# Patient Record
Sex: FEMALE | Race: WHITE | URBAN - METROPOLITAN AREA
[De-identification: names, ages, dates, MRNs, and addresses within clinical notes are randomized per-mention and may not be internally consistent; named-entity substitution may affect disease eponyms.]

---

## 2017-04-27 ENCOUNTER — THERAPY VISIT (OUTPATIENT)
Dept: PHYSICAL THERAPY | Facility: CLINIC | Age: 52
End: 2017-04-27
Payer: COMMERCIAL

## 2017-04-27 DIAGNOSIS — M25.551 HIP PAIN, RIGHT: Primary | ICD-10-CM

## 2017-04-27 PROCEDURE — 97110 THERAPEUTIC EXERCISES: CPT | Mod: GP | Performed by: PHYSICAL THERAPIST

## 2017-04-27 PROCEDURE — 97161 PT EVAL LOW COMPLEX 20 MIN: CPT | Mod: GP | Performed by: PHYSICAL THERAPIST

## 2017-04-27 ASSESSMENT — ACTIVITIES OF DAILY LIVING (ADL)
ROLLING_OVER_IN_BED: MODERATE DIFFICULTY
PUTTING_ON_SOCKS_AND_SHOES: SLIGHT DIFFICULTY
GOING_UP_1_FLIGHT_OF_STAIRS: EXTREME DIFFICULTY
GETTING_INTO_AND_OUT_OF_AN_AVERAGE_CAR: MODERATE DIFFICULTY
WALKING_DOWN_STEEP_HILLS: EXTREME DIFFICULTY
WALKING_UP_STEEP_HILLS: EXTREME DIFFICULTY
HOS_ADL_COUNT: 13
SITTING_FOR_15_MINUTES: MODERATE DIFFICULTY
STANDING_FOR_15_MINUTES: EXTREME DIFFICULTY
LIGHT_TO_MODERATE_WORK: EXTREME DIFFICULTY
HOS_ADL_ITEM_SCORE_TOTAL: 14
RECREATIONAL_ACTIVITIES: UNABLE TO DO
HEAVY_WORK: EXTREME DIFFICULTY
WALKING_APPROXIMATELY_10_MINUTES: EXTREME DIFFICULTY
HOS_ADL_HIGHEST_POTENTIAL_SCORE: 52
WALKING_INITIALLY: EXTREME DIFFICULTY
GOING_DOWN_1_FLIGHT_OF_STAIRS: EXTREME DIFFICULTY
WALKING_15_MINUTES_OR_GREATER: EXTREME DIFFICULTY

## 2017-04-27 NOTE — MR AVS SNAPSHOT
After Visit Summary   4/27/2017    Jessika Chen    MRN: [id number]           Patient Information     Date Of Birth          1965        Visit Information        Provider Department      4/27/2017 1:30 PM Dorothy Mondragon, PT [facility]        Today's Diagnoses     Hip pain, right    -  1       Follow-ups after your visit        Your next 10 appointments already scheduled     May 04, 2017  4:50 PM CDT   PATRICIA Extremity with Dorothy Mondragon, PT   [facility] ([facility]  )    [address]  [facility] [address]   [phone]            May 11, 2017  4:50 PM CDT   PATRICIA Extremity with Dorothy Mondragon, PT   [facility] ([facility]  )    [address]  [facility] [address] [phone]            May 15, 2017  4:50 PM CDT   PATRICIA Extremity with Dorothy Mondragon, PT   [facility] ([facility]  )    [address]  [facility] [address]   [phone]            May 25, 2017  4:50 PM CDT   PATRICIA Extremity with Dorothy Mondragon, PT   [facility] ([facility]  )    [address]  [facility] [address] [phone]              Who to contact     If you have questions or need follow up information about today's clinic visit or your schedule please contact [facility] PHYS[facility] THERAP[facility] directly at [phone].  Normal or non-critical lab and imaging results will be communicated to you by MyChart, letter or phone within 4 business days after the clinic has received the results. If you do not hear from us within 7 days, please contact the clinic through MyChart or phone. If you have a critical or abnormal lab result, we will notify you by phone as soon as possible.  Submit refill requests through [employer] or call your pharmacy and they will forward the refill  "request to us. Please allow 3 business days for your refill to be completed.          Additional Information About Your Visit        MyChart Information     [employer] lets you send messages to your doctor, view your test results, renew your prescriptions, schedule appointments and more. To sign up, go to [email or web address] . Click on \"Log in\" on the left side of the screen, which will take you to the Welcome page. Then click on \"Sign up Now\" on the right side of the page.     You will be asked to enter the access code listed below, as well as some personal information. Please follow the directions to create your username and password.     Your access code is: [email or web address]  Expires: 2017  1:21 PM     Your access code will  in 90 days. If you need help or a new code, please call your [place] clinic or [phone].        Care EveryWhere ID     This is your Care EveryWhere ID. This could be used by other organizations to access your [facility] medical records  [id number]         Blood Pressure from Last 3 Encounters:   No data found for BP    Weight from Last 3 Encounters:   No data found for Wt              We Performed the Following     HC PT EVAL, LOW COMPLEXITY     PATRICIA INITIAL EVAL REPORT     THERAPEUTIC EXERCISES        Primary Care Provider    [facility]       No address on file        Thank you!     Thank you for choosing [facility] THERAP[facility]  for your care. Our goal is always to provide you with excellent care. Hearing back from our patients is one way we can continue to improve our services. Please take a few minutes to complete the written survey that you may receive in the mail after your visit with us. Thank you!             Your Updated Medication List - Protect others around you: Learn how to safely use, store and throw away your medicines at www.disposemymeds.org.      Notice  As of 2017  4:04 PM    You have not been prescribed any " medications.

## 2017-04-27 NOTE — PROGRESS NOTES
[facility] Initial Evaluation    Subjective:    Patient is a 51 year old female presenting with rehab right hip hpi. The history is provided by the patient.   Jessika Chen is a 51 year old female with a right hip condition.  Condition occurred with:  Repetition/overuse.  Condition occurred: at work.  This is a chronic condition  Patient reports onset of R hip pain about 9 months ago.  Relates pain to standing 10 hour shifts 2 days in a row on concrete floors at a part time job.  Patient cut back on hours and then quit job but pain has persisted.  Received physical therapy 4/20/2017..    Patient reports pain:  Groin, posterior and anterior.    Pain is described as aching and is constant and reported as 8/10.  Associated symptoms:  Locking (locking while standing, dec with walking). Pain is the same all the time.  Symptoms are exacerbated by running, walking, descending stairs, ascending stairs, standing and other (crossing legs, disturbed sleep-wakes about 1x/night, not going to gym) and relieved by ice and rest.  Since onset symptoms are gradually worsening.  Special tests:  X-ray (hip dysplasia, mild OA).      General health as reported by patient is excellent.  Pertinent medical history includes:  Overweight, asthma, migraines and sleep disorder/apnea.  Medical allergies: no.  Other surgeries include:  None reported.  Current medications:  Anti-inflammatory.  Current occupation is Self-employed.  Patient is working in normal job without restrictions.  Primary job tasks include:  Prolonged sitting and lifting.    Barriers include:  None as reported by the patient.    Red flags:  None as reported by the patient.                        Objective:      Gait:    Gait Type:  Antalgic                                                      Hip Evaluation  HIP AROM:  : LROM: Flex-Nil loss, inc sx, NW.  Ext-nil loss, NE, NE.  SGIS-mod loss, NE, NE.  Flexion: Left: 105    Right:  91      Abduction:  Left:  45    Right:  35              Hip PROM:    Flexion: Left: 120   Right: 104  Extension: Left:  Right: Limited vs L      Internal Rotation: Left: 45    Right: 10  External Rotation: Left: 65    Right: 50      Pain: ERP with flex, IR on R.  Ext limited on R but not painful.        Hip Strength:        Abduction:  Right: 5/5   -   Pain:                  Hip Special Testing:   Special tests hip not assessed: Repeated movements: repeated ext in kneeling-inc, NW, dec flex, inc IR and ER ROM.  Prone hip IR/ER-dec, B, inc flex.  Press ups-dec, B.    Left hip negative for the following special tests:  Vivek  Right hip negative for the following special tests:  Vivek    Hip Palpation:  Normal                    General     ROS    Assessment/Plan:      Patient is a 51 year old female with right side hip complaints.    Patient has the following significant findings with corresponding treatment plan.                Diagnosis 1:  R hip pain  Pain -  manual therapy, directional preference exercise and home program  Decreased ROM/flexibility - manual therapy, therapeutic exercise and home program  Decreased joint mobility - manual therapy, therapeutic exercise and home program  Impaired gait - gait training and home program  Decreased function - therapeutic activities and home program    Therapy Evaluation Codes:   1) History comprised of:   Personal factors that impact the plan of care:      None.    Comorbidity factors that impact the plan of care are:      None.     Medications impacting care: None.  2) Examination of Body Systems comprised of:   Body structures and functions that impact the plan of care:      Hip.   Activity limitations that impact the plan of care are:      Running and Walking.  3) Clinical presentation characteristics are:   Stable/Uncomplicated.  4) Decision-Making    Low complexity using standardized patient assessment instrument and/or measureable assessment of functional outcome.  Cumulative Therapy  Evaluation is: Low complexity.    Previous and current functional limitations:  (See Goal Flow Sheet for this information)    Short term and Long term goals: (See Goal Flow Sheet for this information)     Communication ability:  Patient appears to be able to clearly communicate and understand verbal and written communication and follow directions correctly.  Treatment Explanation - The following has been discussed with the patient:   RX ordered/plan of care  Anticipated outcomes  Possible risks and side effects  This patient would benefit from PT intervention to resume normal activities.   Rehab potential is good.    Frequency:  1 X week, once daily  Duration:  for 6 weeks  Discharge Plan:  Achieve all LTG.  Independent in home treatment program.  Reach maximal therapeutic benefit.    Please refer to the daily flowsheet for treatment today, total treatment time and time spent performing 1:1 timed codes.

## 2017-04-27 NOTE — LETTER
[facility]ER[facility]  [address]  [facility] [address]  [phone]    2017    Re: Jessika Chen   :   1965  MRN:  [id number]   REFERRING PHYSICIAN:   Celeste Mcneil    [facility]  Date of Initial Evaluation:  17  Visits:  Rxs Used: 1  Reason for Referral:  Hip pain, right    EVALUATION SUMMARY    [facility] Initial Evaluation    Subjective:  Patient is a 51 year old female presenting with rehab right hip hpi. The history is provided by the patient.     Jessika Chen is a 51 year old female with a right hip condition. Condition occurred with:  Repetition/overuse. Condition occurred: at work. This is a chronic condition.  Patient reports onset of R hip pain about 9 months ago.  Relates pain to standing 10 hour shifts 2 days in a row on concrete floors at a part time job.  Patient cut back on hours and then quit job but pain has persisted.  Received physical therapy 2017.      Patient reports pain:  Groin, posterior and anterior. Pain is described as aching and is constant and reported as 8/10.  Associated symptoms: Locking (locking while standing, dec with walking). Pain is the same all the time.  Symptoms are exacerbated by running, walking, descending stairs, ascending stairs, standing and other (crossing legs, disturbed sleep-wakes about 1x/night, not going to gym) and relieved by ice and rest.  Since onset symptoms are gradually worsening. Special tests: X-ray (hip dysplasia, mild OA).          General health as reported by patient is excellent.      Pertinent medical history includes:  Overweight, asthma, migraines and sleep disorder/apnea.  Medical allergies: no.  Other surgeries include:  None reported.  Current medications:  Anti-inflammatory.  Current occupation is Self-employed.  Patient is working in normal job without restrictions.  Primary job tasks include:  Prolonged sitting  and lifting.    Barriers include:  None as reported by the patient.    Red flags:  None as reported by the patient.        Objective:  Gait:    Gait Type:  Antalgic       Hip Evaluation  HIP AROM:  : LROM: Flex-Nil loss, inc sx, NW.  Ext-nil loss, NE, NE.  SGIS-mod loss, NE, NE.  Flexion: Left: 105    Right:  91    Abduction: Left:  45    Right:  35     Hip PROM:    Flexion: Left: 120   Right: 104  Extension: Left:  Right: Limited vs L    Internal Rotation: Left: 45    Right: 10  External Rotation: Left: 65    Right: 50    Pain: ERP with flex, IR on R.  Ext limited on R but not painful.      Hip Strength:  Abduction:  Right: 5/5   -   Pain:    Hip Special Testing:   Special tests hip not assessed: Repeated movements: repeated ext in kneeling-inc, NW, dec flex, inc IR and ER ROM.  Prone hip IR/ER-dec, B, inc flex.  Press ups-dec, B.    Left hip negative for the following special tests:  Vivek  Right hip negative for the following special tests:  Vivek      Hip Palpation:  Normal     Assessment/Plan:    Patient is a 51 year old female with right side hip complaints.    Patient has the following significant findings with corresponding treatment plan.                  Diagnosis 1:  R hip pain  Pain -  manual therapy, directional preference exercise and home program  Decreased ROM/flexibility - manual therapy, therapeutic exercise and home program  Decreased joint mobility - manual therapy, therapeutic exercise and home program  Impaired gait - gait training and home program  Decreased function - therapeutic activities and home program    Therapy Evaluation Codes:   1) History comprised of:   Personal factors that impact the plan of care:      None.    Comorbidity factors that impact the plan of care are:      None.     Medications impacting care: None.    2) Examination of Body Systems comprised of:   Body structures and functions that impact the plan of care:      Hip.   Activity limitations that impact the plan of  care are:      Running and Walking.  3) Clinical presentation characteristics are:   Stable/Uncomplicated.  4) Decision-Making    Low complexity using standardized patient assessment instrument and/or measureable assessment of functional outcome.  Cumulative Therapy Evaluation is: Low complexity.    Previous and current functional limitations:  (See Goal Flow Sheet for this information)    Short term and Long term goals: (See Goal Flow Sheet for this information)     Communication ability:  Patient appears to be able to clearly communicate and understand verbal and written communication and follow directions correctly.    Treatment Explanation - The following has been discussed with the patient:   RX ordered/plan of care  Anticipated outcomes  Possible risks and side effects    This patient would benefit from PT intervention to resume normal activities.   Rehab potential is good.  Frequency:  1 X week, once daily  Duration:  for 6 weeks  Discharge Plan:  Achieve all LTG.  Independent in home treatment program.  Reach maximal therapeutic benefit.    Thank you for your referral.    INQUIRIES  Therapist: Dorothy Mondragon, PT, Cert. MDT  [facility] PHYSICAL THERAP[facility]  [address]  [facility] [address]  Phone: [phone]  Fax: [phone]

## 2017-05-04 ENCOUNTER — THERAPY VISIT (OUTPATIENT)
Dept: PHYSICAL THERAPY | Facility: CLINIC | Age: 52
End: 2017-05-04
Payer: COMMERCIAL

## 2017-05-04 DIAGNOSIS — M25.551 HIP PAIN, RIGHT: ICD-10-CM

## 2017-05-04 PROCEDURE — 97110 THERAPEUTIC EXERCISES: CPT | Mod: GP | Performed by: PHYSICAL THERAPIST

## 2017-05-04 PROCEDURE — 97140 MANUAL THERAPY 1/> REGIONS: CPT | Mod: GP | Performed by: PHYSICAL THERAPIST

## 2017-05-11 ENCOUNTER — THERAPY VISIT (OUTPATIENT)
Dept: PHYSICAL THERAPY | Facility: CLINIC | Age: 52
End: 2017-05-11
Payer: COMMERCIAL

## 2017-05-11 DIAGNOSIS — M25.551 HIP PAIN, RIGHT: ICD-10-CM

## 2017-05-11 PROCEDURE — 97530 THERAPEUTIC ACTIVITIES: CPT | Mod: GP | Performed by: PHYSICAL THERAPIST

## 2017-05-11 PROCEDURE — 97110 THERAPEUTIC EXERCISES: CPT | Mod: GP | Performed by: PHYSICAL THERAPIST

## 2017-05-11 PROCEDURE — 97140 MANUAL THERAPY 1/> REGIONS: CPT | Mod: GP | Performed by: PHYSICAL THERAPIST

## 2017-05-15 ENCOUNTER — THERAPY VISIT (OUTPATIENT)
Dept: PHYSICAL THERAPY | Facility: CLINIC | Age: 52
End: 2017-05-15
Payer: COMMERCIAL

## 2017-05-15 DIAGNOSIS — M25.551 HIP PAIN, RIGHT: ICD-10-CM

## 2017-05-15 PROCEDURE — 97140 MANUAL THERAPY 1/> REGIONS: CPT | Mod: GP | Performed by: PHYSICAL THERAPIST

## 2017-05-15 PROCEDURE — 97110 THERAPEUTIC EXERCISES: CPT | Mod: GP | Performed by: PHYSICAL THERAPIST

## 2017-05-15 NOTE — MR AVS SNAPSHOT
"              After Visit Summary   5/15/2017    Jessika Chen    MRN: [id number]           Patient Information     Date Of Birth          1965        Visit Information        Provider Department      5/15/2017 4:50 PM Dorothy Mondragon, PT [facility]        Today's Diagnoses     Hip pain, right           Follow-ups after your visit        Your next 10 appointments already scheduled     May 25, 2017  4:50 PM CDT   PATRICIA Extremity with Dorothy Mondragon, PT   [facility] ([facility]  )    [address]  [facility] [address] [phone]              Who to contact     If you have questions or need follow up information about today's clinic visit or your schedule please contact [facility] PHYS[facility] THERAP[facility] directly at [phone].  Normal or non-critical lab and imaging results will be communicated to you by MyChart, letter or phone within 4 business days after the clinic has received the results. If you do not hear from us within 7 days, please contact the clinic through [employer] or phone. If you have a critical or abnormal lab result, we will notify you by phone as soon as possible.  Submit refill requests through [employer] or call your pharmacy and they will forward the refill request to us. Please allow 3 business days for your refill to be completed.          Additional Information About Your Visit        MyChart Information     [employer] lets you send messages to your doctor, view your test results, renew your prescriptions, schedule appointments and more. To sign up, go to [email or web address] . Click on \"Log in\" on the left side of the screen, which will take you to the Welcome page. Then click on \"Sign up Now\" on the right side of the page.     You will be asked to enter the access code listed below, as well as some personal information. Please follow the directions to create your username and " password.     Your access code is: [email or web address]  Expires: 2017  1:21 PM     Your access code will  in 90 days. If you need help or a new code, please call your [facility] clinic or [phone].        Care EveryWhere ID     This is your Care EveryWhere ID. This could be used by other organizations to access your [facility] medical records  [id number]         Blood Pressure from Last 3 Encounters:   No data found for BP    Weight from Last 3 Encounters:   No data found for Wt              We Performed the Following     MANUAL THER TECH,1+REGIONS,EA 15 MIN     THERAPEUTIC EXERCISES        Primary Care Provider    [facility]       No address on file        Thank you!     Thank you for choosing [facility] PHYS[facility] THERAP[facility]  for your care. Our goal is always to provide you with excellent care. Hearing back from our patients is one way we can continue to improve our services. Please take a few minutes to complete the written survey that you may receive in the mail after your visit with us. Thank you!             Your Updated Medication List - Protect others around you: Learn how to safely use, store and throw away your medicines at www.disposemymeds.org.      Notice  As of 5/15/2017  5:32 PM    You have not been prescribed any medications.

## 2017-09-25 PROBLEM — M25.551 HIP PAIN, RIGHT: Status: RESOLVED | Noted: 2017-04-27 | Resolved: 2017-09-25

## 2017-09-25 NOTE — PROGRESS NOTES
"Subjective:    HPI                    Objective:    System    Physical Exam    General     ROS    Assessment/Plan:      DISCHARGE REPORT    Progress reporting period is from 4/27/2017 to 5/15/2017.  Patient was seen for 4 visits.    SUBJECTIVE  Subjective: Patient reports improving symptoms.  Pain has been primarily in the 1-3 range since last visit.  Hip pain flared up last night after planting all day yesterday.  Has noticed improved ambulation.  Strength has improved with exercises and has been able to add plank ex at home up to 60\".  Pain currently 1/10 at hip flexor tendon.   Current Pain level: 1/10   Initial Pain level: 8/10   Changes in function: Yes, see goal flow sheet for change in function   Adverse reactions: None;   ,     The subjective and objective information are from the last SOAP note on this patient.    OBJECTIVE  Objective: Gait: demonstrating improved follow through.  ROM:120 deg hip flex with ERP.  PROM: full flex, IR, ER; ERP with flexion.      ASSESSMENT/PLAN  Updated problem list and treatment plan: Diagnosis 1:  R hip pain  Pain -  home program  Decreased ROM/flexibility - home program  Decreased joint mobility - home program  Impaired gait - home program  Decreased function - home program  STG/LTGs have been met or progress has been made towards goals:  Yes (See Goal flow sheet completed today.)  Assessment of Progress: The patient has not returned to therapy. Current status is unknown.  Self Management Plans:  Patient has been instructed in a home treatment program.      We will discharge this patient from PT.    Recommendations:  Discharge with HEP.    Please refer to the daily flowsheet for treatment today, total treatment time and time spent performing 1:1 timed codes.          "